# Patient Record
Sex: FEMALE | Race: WHITE | NOT HISPANIC OR LATINO
[De-identification: names, ages, dates, MRNs, and addresses within clinical notes are randomized per-mention and may not be internally consistent; named-entity substitution may affect disease eponyms.]

---

## 2019-08-20 ENCOUNTER — RECORD ABSTRACTING (OUTPATIENT)
Age: 69
End: 2019-08-20

## 2019-08-20 ENCOUNTER — NON-APPOINTMENT (OUTPATIENT)
Age: 69
End: 2019-08-20

## 2019-08-20 ENCOUNTER — APPOINTMENT (OUTPATIENT)
Dept: CARDIOLOGY | Facility: CLINIC | Age: 69
End: 2019-08-20
Payer: MEDICARE

## 2019-08-20 VITALS
DIASTOLIC BLOOD PRESSURE: 70 MMHG | BODY MASS INDEX: 19.97 KG/M2 | WEIGHT: 117 LBS | HEART RATE: 70 BPM | OXYGEN SATURATION: 97 % | SYSTOLIC BLOOD PRESSURE: 110 MMHG | HEIGHT: 64 IN

## 2019-08-20 DIAGNOSIS — Z82.49 FAMILY HISTORY OF ISCHEMIC HEART DISEASE AND OTHER DISEASES OF THE CIRCULATORY SYSTEM: ICD-10-CM

## 2019-08-20 DIAGNOSIS — Z82.3 FAMILY HISTORY OF STROKE: ICD-10-CM

## 2019-08-20 DIAGNOSIS — Z80.41 FAMILY HISTORY OF MALIGNANT NEOPLASM OF OVARY: ICD-10-CM

## 2019-08-20 DIAGNOSIS — L65.9 NONSCARRING HAIR LOSS, UNSPECIFIED: ICD-10-CM

## 2019-08-20 DIAGNOSIS — G47.00 INSOMNIA, UNSPECIFIED: ICD-10-CM

## 2019-08-20 DIAGNOSIS — Z87.891 PERSONAL HISTORY OF NICOTINE DEPENDENCE: ICD-10-CM

## 2019-08-20 DIAGNOSIS — Z78.9 OTHER SPECIFIED HEALTH STATUS: ICD-10-CM

## 2019-08-20 DIAGNOSIS — R41.3 OTHER AMNESIA: ICD-10-CM

## 2019-08-20 DIAGNOSIS — Z78.0 ASYMPTOMATIC MENOPAUSAL STATE: ICD-10-CM

## 2019-08-20 PROCEDURE — 93000 ELECTROCARDIOGRAM COMPLETE: CPT

## 2019-08-20 PROCEDURE — 99204 OFFICE O/P NEW MOD 45 MIN: CPT

## 2019-08-20 RX ORDER — LEVOTHYROXINE SODIUM 88 UG/1
88 TABLET ORAL DAILY
Refills: 0 | Status: ACTIVE | COMMUNITY

## 2019-08-20 NOTE — REASON FOR VISIT
[Consultation] : a consultation regarding [Dizziness] : dizziness [Mitral Regurgitation] : mitral regurgitation [Palpitations] : palpitations [FreeTextEntry1] : 69 year old, white female is referred to me for consultation with complaint of intermittent palpitations. Once associated with dizziness while exercising. Most of the time at rest. No near syncope, or syncope. No chest pain or shortness of breath. Usually in the form of skipping beats. Her extra beats, which gives her rush. Recently, she has been started on antibiotics for lyme.\par She has no PND, orthopnea, pedal edema.\par She denies any other over-the-counter medication.\par She has been on Synthroid replacement for many years and according to a stable TSH.\par She has history of mitral valve prolapse and mitral insufficiency.\par Her diet. Activity level is stable.\par She is stable. Weight.\par She is stable. Sleep pattern.\par No claudication pain.\par No recent hospitalization.\par No significant over-the-counter medications \par

## 2019-08-20 NOTE — DISCUSSION/SUMMARY
[FreeTextEntry1] : 69-year-old female with above medical history active medical problems as noted below.\par ntermittent palpitations associated with rare dizziness/ no near syncopal or syncopal event and one episode of exertional palpitations. In presence of a history of mitral valve prolapse, mitral insufficiency. Her EKG shows normal sinus rhythm with normal intervals.\par Clinical examination to support diagnosis of mitral valve prolapse and mitral regurgitation.\par \par At present I recommended further evaluation with\par #1 24-hour Holter monitor to assess evaluate and rule out any significant high-grade cardiac arrhythmia\par #2 echocardiogram to evaluate his left ventricular ejection fraction, left atrial size, mitral valve prolapse, mitral insufficiency severity.\par #3 carotid Doppler study is recommended in presence of intermittent dizzinessto rule out carotid/vertebral basilar insufficiency.\par #4 increase fluid intake.\par #5 management of thyroid disorder. Would also help with many of her symptoms.\par #6 She will also have exercise treadmill stress test considering some of her symptoms related to exercise.\par \par Counseling regarding low saturated fat, salt and carbohydrate intake was reviewed. Active lifestyle and regular. Exercise along with weight management is advised.\par All the above were at length reviewed. Answered all the questions. Thank you very much for this kind referral. Please do not hesitate to give me a call for any question.\par Part of this transcription was done with voice recognition software and phonetically similar errors are common. I apologize for that. Please do not hesitate to call for any questions due to above.\par \par

## 2019-08-20 NOTE — PHYSICAL EXAM
[General Appearance - Well Developed] : well developed [Normal Appearance] : normal appearance [Well Groomed] : well groomed [General Appearance - Well Nourished] : well nourished [No Deformities] : no deformities [General Appearance - In No Acute Distress] : no acute distress [Normal Conjunctiva] : the conjunctiva exhibited no abnormalities [Eyelids - No Xanthelasma] : the eyelids demonstrated no xanthelasmas [Normal Oral Mucosa] : normal oral mucosa [No Oral Pallor] : no oral pallor [No Oral Cyanosis] : no oral cyanosis [Normal Jugular Venous A Waves Present] : normal jugular venous A waves present [No Jugular Venous Gabriel A Waves] : no jugular venous gabriel A waves [Normal Jugular Venous V Waves Present] : normal jugular venous V waves present [Normal] : normal [No Precordial Heave] : no precordial heave was noted [Normal Rate] : normal [Normal S2] : normal S2 [Normal S1] : normal S1 [No Gallop] : no gallop heard [Click] : a ~M click was heard [Midsystolic] : during midsystole [I] : a grade 1 [2+] : left 2+ [No Abnormalities] : the abdominal aorta was not enlarged and no bruit was heard [No Pitting Edema] : no pitting edema present [Respiration, Rhythm And Depth] : normal respiratory rhythm and effort [Auscultation Breath Sounds / Voice Sounds] : lungs were clear to auscultation bilaterally [Exaggerated Use Of Accessory Muscles For Inspiration] : no accessory muscle use [Abdomen Soft] : soft [Abdomen Tenderness] : non-tender [Abnormal Walk] : normal gait [Abdomen Mass (___ Cm)] : no abdominal mass palpated [Nail Clubbing] : no clubbing of the fingernails [Gait - Sufficient For Exercise Testing] : the gait was sufficient for exercise testing [Cyanosis, Localized] : no localized cyanosis [Petechial Hemorrhages (___cm)] : no petechial hemorrhages [Skin Color & Pigmentation] : normal skin color and pigmentation [] : no rash [Skin Lesions] : no skin lesions [No Venous Stasis] : no venous stasis [No Skin Ulcers] : no skin ulcer [No Xanthoma] : no  xanthoma was observed [Oriented To Time, Place, And Person] : oriented to person, place, and time [Affect] : the affect was normal [Mood] : the mood was normal [No Anxiety] : not feeling anxious [S3] : no S3 [S4] : no S4 [Right Carotid Bruit] : no bruit heard over the right carotid [Left Carotid Bruit] : no bruit heard over the left carotid [Right Femoral Bruit] : no bruit heard over the right femoral artery [Left Femoral Bruit] : no bruit heard over the left femoral artery [FreeTextEntry1] : No abdominal pulsation or bruit noted

## 2019-08-20 NOTE — ASSESSMENT
[FreeTextEntry1] : Reviewed on August 20, 2019\par EKG D. August 20, 2019. Normal sinus rhythm.\par Normal intervals.\par Recent labs. Normal vitamin D level. Normal CMP. , triglycerides 62, HDL 92, total cholesterol 213. CBC was stable. TSH was 6.57\par \par Echocardiogram 2006. Ejection fraction 58%. There was mild mitral regurgitation, mild to moderate tricuspid regurgitation. There was no mitral valve prolapse at that time.

## 2019-09-30 ENCOUNTER — APPOINTMENT (OUTPATIENT)
Dept: CARDIOLOGY | Facility: CLINIC | Age: 69
End: 2019-09-30
Payer: MEDICARE

## 2019-09-30 PROCEDURE — 93880 EXTRACRANIAL BILAT STUDY: CPT

## 2019-09-30 PROCEDURE — 93015 CV STRESS TEST SUPVJ I&R: CPT

## 2019-09-30 PROCEDURE — 93306 TTE W/DOPPLER COMPLETE: CPT

## 2019-10-02 ENCOUNTER — APPOINTMENT (OUTPATIENT)
Dept: CARDIOLOGY | Facility: CLINIC | Age: 69
End: 2019-10-02
Payer: MEDICARE

## 2019-10-02 VITALS
WEIGHT: 119 LBS | SYSTOLIC BLOOD PRESSURE: 102 MMHG | HEIGHT: 64 IN | HEART RATE: 71 BPM | BODY MASS INDEX: 20.32 KG/M2 | OXYGEN SATURATION: 99 % | DIASTOLIC BLOOD PRESSURE: 62 MMHG

## 2019-10-02 PROCEDURE — 93224 XTRNL ECG REC UP TO 48 HRS: CPT

## 2019-10-02 PROCEDURE — 99215 OFFICE O/P EST HI 40 MIN: CPT

## 2019-10-02 RX ORDER — ACETAMINOPHEN/DIPHENHYDRAMINE 500MG-25MG
1000 TABLET ORAL
Refills: 0 | Status: ACTIVE | COMMUNITY

## 2019-10-02 NOTE — REASON FOR VISIT
[Follow-Up - Clinic] : a clinic follow-up of [Mitral Regurgitation] : mitral regurgitation [Dizziness] : dizziness [Palpitations] : palpitations [FreeTextEntry1] : 69-year-old female comes for followup consultation due to review her echocardiogram stress test and carotid Dopplers. 3.\par Since last seen after stopping doxycycline. Most of her symptoms have resolved.\par She has no PND, orthopnea, pedal edema.\par She denies any other over-the-counter medication.\par She has been on Synthroid replacement for many years and according to a stable TSH.\par She has history of mitral valve prolapse and mitral insufficiency.\par Her diet. Activity level is stable.\par She is stable. Weight.\par She is stable. Sleep pattern.\par No claudication pain.\par No recent hospitalization.\par No significant over-the-counter medications \par

## 2019-10-02 NOTE — DISCUSSION/SUMMARY
[FreeTextEntry1] : 69-year-old female with above medical history an active medical problems as noted below.\par #1 echocardiogram reviewed were mild mitral regurgitation with normal chamber dimension and preserved. Ejection fraction reviewed. Consider followup in about 2-3 years or for any change in her symptoms.\par #2 exercise treadmill stress test reviewed. equvivocal findings discussed. Good prognostic information reviewed. Missing diagnosis in presence of equvivocal stress test was discussed. Further evaluation with stress echocardiogram or a myocardial perfusion scan discussed. She has declined at present. Recommended continued aggressive lifestyle and risk factor modifications. Discussed with her regarding 81 mg of aspirin for preventive reasons specified in presence of evidence of carotid atherosclerosis.\par #3 fasting lipid panel ordered. In presence of carotid atherosclerosis and a bowel stress test findings. If LDL cholesterol is greater than 70 consider statin therapy.\par \par If recurrence of palpitations, exertional shortness of breath, or chest pain. She is recommended. Contact us immediately so that appropriate for their evaluation, which may include further cardiovascular test like invasive coronary angiography can be discussed.\par \par Counseling regarding low saturated fat, salt and carbohydrate intake was reviewed. Active lifestyle and regular. Exercise along with weight management is advised.\par All the above were at length reviewed. Answered all the questions. Thank you very much for this kind referral. Please do not hesitate to give me a call for any question.\par Part of this transcription was done with voice recognition software and phonetically similar errors are common. I apologize for that. Please do not hesitate to call for any questions due to above.\par \par Followup at present is planned in one year unless change in her symptoms or her decision. P.

## 2019-10-02 NOTE — PHYSICAL EXAM
[General Appearance - Well Developed] : well developed [Normal Appearance] : normal appearance [Well Groomed] : well groomed [No Deformities] : no deformities [General Appearance - Well Nourished] : well nourished [General Appearance - In No Acute Distress] : no acute distress [Normal Conjunctiva] : the conjunctiva exhibited no abnormalities [Eyelids - No Xanthelasma] : the eyelids demonstrated no xanthelasmas [Normal Oral Mucosa] : normal oral mucosa [No Oral Pallor] : no oral pallor [No Oral Cyanosis] : no oral cyanosis [Normal Jugular Venous V Waves Present] : normal jugular venous V waves present [Normal Jugular Venous A Waves Present] : normal jugular venous A waves present [No Jugular Venous Gabriel A Waves] : no jugular venous gabriel A waves [Exaggerated Use Of Accessory Muscles For Inspiration] : no accessory muscle use [] : no respiratory distress [Respiration, Rhythm And Depth] : normal respiratory rhythm and effort [Abdomen Mass (___ Cm)] : no abdominal mass palpated [Gait - Sufficient For Exercise Testing] : the gait was sufficient for exercise testing [Abnormal Walk] : normal gait [Nail Clubbing] : no clubbing of the fingernails [Cyanosis, Localized] : no localized cyanosis [Skin Color & Pigmentation] : normal skin color and pigmentation [Affect] : the affect was normal [Oriented To Time, Place, And Person] : oriented to person, place, and time [No Anxiety] : not feeling anxious [Mood] : the mood was normal [Normal] : normal [No Precordial Heave] : no precordial heave was noted [Normal Rate] : normal [Normal S1] : normal S1 [Normal S2] : normal S2 [No Gallop] : no gallop heard [S3] : no S3 [S4] : no S4 [Click] : a ~M click was heard [I] : a grade 1 [Midsystolic] : during midsystole [Right Carotid Bruit] : no bruit heard over the right carotid [Left Carotid Bruit] : no bruit heard over the left carotid [Right Femoral Bruit] : no bruit heard over the right femoral artery [Left Femoral Bruit] : no bruit heard over the left femoral artery [2+] : left 2+ [No Abnormalities] : the abdominal aorta was not enlarged and no bruit was heard [No Pitting Edema] : no pitting edema present

## 2019-10-02 NOTE — ASSESSMENT
[FreeTextEntry1] : Reviewed on August 20, 2019\par EKG D. August 20, 2019. Normal sinus rhythm.\par Normal intervals.\par Recent labs. Normal vitamin D level. Normal CMP. , triglycerides 62, HDL 92, total cholesterol 213. CBC was stable. TSH was 6.57\par \par Echocardiogram 2006. Ejection fraction 58%. There was mild mitral regurgitation, mild to moderate tricuspid regurgitation. There was no mitral valve prolapse at that time.\par \par Reviewed on October 2, 2019\par Acrochordon September 30, 2090 and ejection fraction 60% mild mitral regurgitation. Minimal aortic regurgitation. No significant pericardial effusion. Normal chamber dimensions.\par Exercise treadmill stress test September 30, 2019 p.o. 7 minutes and 32 seconds of Surya protocol. Asymptomatic. Normal blood pressure response were 96% predicted. Maximum heart rate. 9 METs workload. Equvivocall EKG changes.\par Carotid Doppler study. September 30, 2019. Atherosclerosis noted. Nonobstructive less than 50% bilaterally

## 2019-10-03 ENCOUNTER — APPOINTMENT (OUTPATIENT)
Dept: CARDIOLOGY | Facility: CLINIC | Age: 69
End: 2019-10-03

## 2020-05-08 ENCOUNTER — NON-APPOINTMENT (OUTPATIENT)
Age: 70
End: 2020-05-08

## 2020-05-08 ENCOUNTER — APPOINTMENT (OUTPATIENT)
Dept: CARDIOLOGY | Facility: CLINIC | Age: 70
End: 2020-05-08
Payer: MEDICARE

## 2020-05-08 VITALS
HEART RATE: 74 BPM | OXYGEN SATURATION: 97 % | HEIGHT: 64 IN | BODY MASS INDEX: 19.81 KG/M2 | TEMPERATURE: 98.1 F | WEIGHT: 116 LBS | SYSTOLIC BLOOD PRESSURE: 114 MMHG | DIASTOLIC BLOOD PRESSURE: 62 MMHG

## 2020-05-08 DIAGNOSIS — I34.1 NONRHEUMATIC MITRAL (VALVE) PROLAPSE: ICD-10-CM

## 2020-05-08 DIAGNOSIS — E03.9 HYPOTHYROIDISM, UNSPECIFIED: ICD-10-CM

## 2020-05-08 DIAGNOSIS — Z00.00 ENCOUNTER FOR GENERAL ADULT MEDICAL EXAMINATION W/OUT ABNORMAL FINDINGS: ICD-10-CM

## 2020-05-08 PROCEDURE — 93000 ELECTROCARDIOGRAM COMPLETE: CPT | Mod: 59

## 2020-05-08 PROCEDURE — 99214 OFFICE O/P EST MOD 30 MIN: CPT

## 2020-05-08 PROCEDURE — 0296T: CPT

## 2020-05-08 RX ORDER — CA/D3/MAG OX/ZINC/COP/MANG/BOR 600 MG-800
250 MCG TABLET,CHEWABLE ORAL DAILY
Refills: 0 | Status: DISCONTINUED | COMMUNITY
End: 2020-05-08

## 2020-05-08 RX ORDER — ASPIRIN ENTERIC COATED TABLETS 81 MG 81 MG/1
81 TABLET, DELAYED RELEASE ORAL DAILY
Refills: 0 | Status: ACTIVE | COMMUNITY

## 2020-05-08 RX ORDER — EUCALYPTUS OIL/MENTHOL/CAMPHOR 1.2%-4.8%
1000 OINTMENT (GRAM) TOPICAL
Refills: 0 | Status: DISCONTINUED | COMMUNITY
End: 2020-05-08

## 2020-05-08 NOTE — PHYSICAL EXAM
[General Appearance - Well Developed] : well developed [Normal Appearance] : normal appearance [Well Groomed] : well groomed [General Appearance - Well Nourished] : well nourished [No Deformities] : no deformities [General Appearance - In No Acute Distress] : no acute distress [Normal Jugular Venous A Waves Present] : normal jugular venous A waves present [Normal Jugular Venous V Waves Present] : normal jugular venous V waves present [No Jugular Venous Gabriel A Waves] : no jugular venous gabriel A waves [Exaggerated Use Of Accessory Muscles For Inspiration] : no accessory muscle use [] : no respiratory distress [Respiration, Rhythm And Depth] : normal respiratory rhythm and effort [Abnormal Walk] : normal gait [Nail Clubbing] : no clubbing of the fingernails [Gait - Sufficient For Exercise Testing] : the gait was sufficient for exercise testing [Cyanosis, Localized] : no localized cyanosis [Skin Color & Pigmentation] : normal skin color and pigmentation [Affect] : the affect was normal [Oriented To Time, Place, And Person] : oriented to person, place, and time [Mood] : the mood was normal [No Anxiety] : not feeling anxious [Normal Rate] : normal [No Precordial Heave] : no precordial heave was noted [Normal] : normal [Normal S2] : normal S2 [Normal S1] : normal S1 [No Gallop] : no gallop heard [S3] : no S3 [S4] : no S4 [Midsystolic] : during midsystole [Click] : a ~M click was heard [I] : a grade 1 [Right Carotid Bruit] : no bruit heard over the right carotid [Left Carotid Bruit] : no bruit heard over the left carotid [Left Femoral Bruit] : no bruit heard over the left femoral artery [Right Femoral Bruit] : no bruit heard over the right femoral artery [No Abnormalities] : the abdominal aorta was not enlarged and no bruit was heard [2+] : left 2+ [No Pitting Edema] : no pitting edema present

## 2020-05-08 NOTE — REASON FOR VISIT
[Acute Exacerbation] : an acute exacerbation of [Dizziness] : dizziness [Mitral Regurgitation] : mitral regurgitation [Palpitations] : palpitations [FreeTextEntry1] : 69-year-old female came to the office for urgent evaluation as increase intermittent palpitations.  Skipping beats.  At rest without exertion.  No associated dizziness lightheadedness or syncopal event.  No chest pain.  Nonexertional.  She has caffeine and alcohol daily.  Some worsening post her daily alcohol.\par She has no PND, orthopnea, pedal edema.\par She denies any other over-the-counter medication.\par She has been on Synthroid replacement for many years and according to a stable TSH.\par She has history of mitral valve prolapse and mitral insufficiency.\par Her diet. Activity level is stable.\par She is stable. Weight.\par She is stable. Sleep pattern.\par No claudication pain.\par No recent hospitalization.\par No significant over-the-counter medications \par

## 2020-05-08 NOTE — ASSESSMENT
[FreeTextEntry1] : Reviewed on August 20, 2019\par EKG D. August 20, 2019. Normal sinus rhythm.\par Normal intervals.\par Recent labs. Normal vitamin D level. Normal CMP. , triglycerides 62, HDL 92, total cholesterol 213. CBC was stable. TSH was 6.57\par \par Echocardiogram 2006. Ejection fraction 58%. There was mild mitral regurgitation, mild to moderate tricuspid regurgitation. There was no mitral valve prolapse at that time.\par \par Reviewed on October 2, 2019\par Acrochordon September 30, 2090 and ejection fraction 60% mild mitral regurgitation. Minimal aortic regurgitation. No significant pericardial effusion. Normal chamber dimensions.\par Exercise treadmill stress test September 30, 2019 p.o. 7 minutes and 32 seconds of Surya protocol. Asymptomatic. Normal blood pressure response were 96% predicted. Maximum heart rate. 9 METs workload. Equvivocall EKG changes.\par Carotid Doppler study. September 30, 2019. Atherosclerosis noted. Nonobstructive less than 50% bilaterally\par \par Reviewed 5/8/2020\par EKG normal sinus rhythm.  Heart rate 70.  Nonspecific ST-T changes.  Normal intervals.

## 2020-05-08 NOTE — DISCUSSION/SUMMARY
[FreeTextEntry1] : 69-year-old female with above medical history an active medical problems as noted below.\par #1  Intermittent palpitations.  Unclear etiology.  Worsened recently.  Recommended decrease caffeine and alcohol intake.  Last echocardiogram stable with preserved LV ejection fraction.  Mild mitral regurgitation..  Nonexertional symptoms.  Last exercise stress test had shown equivocal EKG changes but no exercise-induced arrhythmias.  She has no unstable chest pain symptoms.\par Recommended her to have labs as ordered and 2-day event recorder.\par Based on those tests we can discuss further whether she would benefit from beta-blockers as well as further cardiovascular test in the form of echocardiogram, ischemic evaluation etc.\par #2 fasting lipid panel ordered. In presence of carotid atherosclerosis and a bowel stress test findings. If LDL cholesterol is greater than 70 consider statin therapy.\par If any significant worsening she will call 911 or go to the nearest emergency room.\par \par Counseling regarding low saturated fat, salt and carbohydrate intake was reviewed. Active lifestyle and regular. Exercise along with weight management is advised.\par All the above were at length reviewed. Answered all the questions. Thank you very much for this kind referral. Please do not hesitate to give me a call for any question.\par Part of this transcription was done with voice recognition software and phonetically similar errors are common. I apologize for that. Please do not hesitate to call for any questions due to above.\par \par

## 2020-05-08 NOTE — REVIEW OF SYSTEMS
[see HPI] : see HPI [Shortness Of Breath] : no shortness of breath [Dyspnea on exertion] : not dyspnea during exertion [Chest  Pressure] : no chest pressure [Lower Ext Edema] : no extremity edema [Chest Pain] : no chest pain [Leg Claudication] : no intermittent leg claudication [Palpitations] : palpitations [Negative] : Endocrine

## 2020-05-19 PROCEDURE — 0298T: CPT

## 2020-05-22 ENCOUNTER — APPOINTMENT (OUTPATIENT)
Dept: CARDIOLOGY | Facility: CLINIC | Age: 70
End: 2020-05-22
Payer: MEDICARE

## 2020-05-22 VITALS — BODY MASS INDEX: 19.63 KG/M2 | WEIGHT: 115 LBS | HEIGHT: 64 IN

## 2020-05-22 PROCEDURE — 99214 OFFICE O/P EST MOD 30 MIN: CPT | Mod: 95

## 2020-05-22 NOTE — REASON FOR VISIT
[FreeTextEntry1] : \par No claudication pain.\par No recent hospitalization.\par No significant over-the-counter medications \par

## 2020-05-22 NOTE — HISTORY OF PRESENT ILLNESS
[Home] : at home, [unfilled] , at the time of the visit. [Medical Office: ([facility])___] : at the medical office located in  [FreeTextEntry1] : Consent: Patient consented to virtual video visit.\par Time: A total of 20 minutes was spent in review of pertinent medical records, discussion with the patient, evaluation of the patient problem and coordination of the care plan as part of this online visit.\par \par No physical examination was performed as this visit was performed virtually with exceptions as noted below.\par as judged the risk of COVID-19 cross-contamination to be greater than benefit to the patient conferred by the physical examination.\par \par 69-year-old female came to the office for urgent evaluation as increase intermittent palpitations.  Skipping beats.  At rest without exertion.  No associated dizziness lightheadedness or syncopal event.  No chest pain.  Nonexertional.  She has caffeine and alcohol daily.  Some worsening post her daily alcohol.\par She has no PND, orthopnea, pedal edema.\par She denies any other over-the-counter medication.\par She has been on Synthroid replacement for many years and according to a stable TSH.\par She has history of mitral valve prolapse and mitral insufficiency.\par Her diet. Activity level is stable.\par She is stable. Weight.\par She is stable. Sleep pattern.\par \par Since her bowel according to her with decreasing her caffeine intake her symptoms have almost resolved.\par Her activity is improving gradually\par I have reviewed her Holter/event recorder and labs today.\par No recent subsequent hospital admission

## 2020-05-22 NOTE — DISCUSSION/SUMMARY
[FreeTextEntry1] : 69-year-old female with above medical history an active medical problems as noted below.\par #1  Intermittent palpitations.  Appears to be associated with premature atrial ventricular beats.  She has overall preserved LV ejection fraction.  No significant signs of unstable CAD.  No syncope.  Stable labs.\par Reviewed pathophysiology of her symptoms.\par Good prognostic information's discussed.\par Role of medications like beta-blocker/calcium channel blocker in management of her symptomatic premature atrial beats were reviewed.\par Considering her symptoms have significantly improved with quality of life altering changes in the form of decrease caffeine and increasing activity she has decided against using medications.  She understands risk and benefits of this present medications\par If any worsening symptoms she will contact me otherwise she will continue with lifestyle modification.\par #2  Reviewed labs which includes lipid panel.  Present no statin therapy advised.\par \par Counseling regarding low saturated fat, salt and carbohydrate intake was reviewed. Active lifestyle and regular. Exercise along with weight management is advised.\par All the above were at length reviewed. Answered all the questions. Thank you very much for this kind referral. Please do not hesitate to give me a call for any question.\par Part of this transcription was done with voice recognition software and phonetically similar errors are common. I apologize for that. Please do not hesitate to call for any questions due to above.\par \par

## 2020-05-22 NOTE — ASSESSMENT
[FreeTextEntry1] : Reviewed on August 20, 2019\par EKG D. August 20, 2019. Normal sinus rhythm.\par Normal intervals.\par Recent labs. Normal vitamin D level. Normal CMP. , triglycerides 62, HDL 92, total cholesterol 213. CBC was stable. TSH was 6.57\par \par Echocardiogram 2006. Ejection fraction 58%. There was mild mitral regurgitation, mild to moderate tricuspid regurgitation. There was no mitral valve prolapse at that time.\par \par Reviewed on October 2, 2019\par Acrochordon September 30, 2090 and ejection fraction 60% mild mitral regurgitation. Minimal aortic regurgitation. No significant pericardial effusion. Normal chamber dimensions.\par Exercise treadmill stress test September 30, 2019 p.o. 7 minutes and 32 seconds of Surya protocol. Asymptomatic. Normal blood pressure response were 96% predicted. Maximum heart rate. 9 METs workload. Equvivocall EKG changes.\par Carotid Doppler study. September 30, 2019. Atherosclerosis noted. Nonobstructive less than 50% bilaterally\par \par Reviewed 5/8/2020\par EKG normal sinus rhythm.  Heart rate 70.  Nonspecific ST-T changes.  Normal intervals.\par \par Reviewed on May 22, 2020\par Labs May 12, 2020.  Stable CBC, CMP, TSH.  Normal Lyme titers.  Magnesium normal.   total cholesterol 210 HDL 94 triglycerides 6410-year ASCVD risk less than 7.5%\par Cardiac event monitoring showed premature atrial or ventricular beats.  Association with PACs and symptoms noted.  No significant high risk arrhythmias.

## 2020-05-22 NOTE — REVIEW OF SYSTEMS
[see HPI] : see HPI [Shortness Of Breath] : no shortness of breath [Dyspnea on exertion] : not dyspnea during exertion [Chest  Pressure] : no chest pressure [Chest Pain] : no chest pain [Lower Ext Edema] : no extremity edema [Palpitations] : palpitations [Leg Claudication] : no intermittent leg claudication [Negative] : Heme/Lymph

## 2020-05-22 NOTE — PHYSICAL EXAM
[Normal Appearance] : normal appearance [General Appearance - In No Acute Distress] : no acute distress [] : no respiratory distress [Respiration, Rhythm And Depth] : normal respiratory rhythm and effort [Exaggerated Use Of Accessory Muscles For Inspiration] : no accessory muscle use [Oriented To Time, Place, And Person] : oriented to person, place, and time [Affect] : the affect was normal [Mood] : the mood was normal [No Anxiety] : not feeling anxious

## 2020-11-20 ENCOUNTER — APPOINTMENT (OUTPATIENT)
Dept: CARDIOLOGY | Facility: CLINIC | Age: 70
End: 2020-11-20
Payer: MEDICARE

## 2020-11-20 VITALS
TEMPERATURE: 97.8 F | WEIGHT: 115 LBS | BODY MASS INDEX: 19.63 KG/M2 | SYSTOLIC BLOOD PRESSURE: 120 MMHG | HEART RATE: 65 BPM | DIASTOLIC BLOOD PRESSURE: 60 MMHG | OXYGEN SATURATION: 99 % | HEIGHT: 64 IN

## 2020-11-20 DIAGNOSIS — I49.49 OTHER PREMATURE DEPOLARIZATION: ICD-10-CM

## 2020-11-20 DIAGNOSIS — I34.0 NONRHEUMATIC MITRAL (VALVE) INSUFFICIENCY: ICD-10-CM

## 2020-11-20 DIAGNOSIS — R00.2 PALPITATIONS: ICD-10-CM

## 2020-11-20 DIAGNOSIS — R42 DIZZINESS AND GIDDINESS: ICD-10-CM

## 2020-11-20 DIAGNOSIS — I65.23 OCCLUSION AND STENOSIS OF BILATERAL CAROTID ARTERIES: ICD-10-CM

## 2020-11-20 DIAGNOSIS — R94.30 ABNORMAL RESULT OF CARDIOVASCULAR FUNCTION STUDY, UNSPECIFIED: ICD-10-CM

## 2020-11-20 PROCEDURE — 99214 OFFICE O/P EST MOD 30 MIN: CPT

## 2020-11-20 RX ORDER — CHOLECALCIFEROL (VITAMIN D3) 25 MCG
TABLET ORAL
Refills: 0 | Status: ACTIVE | COMMUNITY

## 2020-11-20 RX ORDER — SERTRALINE 25 MG/1
25 TABLET, FILM COATED ORAL DAILY
Refills: 0 | Status: DISCONTINUED | COMMUNITY
End: 2020-11-20

## 2020-11-20 NOTE — PHYSICAL EXAM
[General Appearance - Well Developed] : well developed [Normal Appearance] : normal appearance [Well Groomed] : well groomed [General Appearance - Well Nourished] : well nourished [No Deformities] : no deformities [General Appearance - In No Acute Distress] : no acute distress [FreeTextEntry1] : no JVD, no bruit [Respiration, Rhythm And Depth] : normal respiratory rhythm and effort [Exaggerated Use Of Accessory Muscles For Inspiration] : no accessory muscle use [Auscultation Breath Sounds / Voice Sounds] : lungs were clear to auscultation bilaterally [Heart Rate And Rhythm] : heart rate and rhythm were normal [Heart Sounds] : normal S1 and S2 [Murmurs] : no murmurs present [Edema] : no peripheral edema present [Abdomen Soft] : soft [Abnormal Walk] : normal gait [Gait - Sufficient For Exercise Testing] : the gait was sufficient for exercise testing [Nail Clubbing] : no clubbing of the fingernails [Cyanosis, Localized] : no localized cyanosis [Petechial Hemorrhages (___cm)] : no petechial hemorrhages [Skin Color & Pigmentation] : normal skin color and pigmentation [] : no rash [No Venous Stasis] : no venous stasis [Skin Lesions] : no skin lesions [No Skin Ulcers] : no skin ulcer [No Xanthoma] : no  xanthoma was observed [Oriented To Time, Place, And Person] : oriented to person, place, and time [Affect] : the affect was normal [Mood] : the mood was normal [No Anxiety] : not feeling anxious

## 2020-11-20 NOTE — DISCUSSION/SUMMARY
[FreeTextEntry1] : 70-year-old female with above medical history an active medical problems as noted below:\par \par #1  Intermittent palpitations.  Appears to be associated with premature atrial ventricular beats.  She has overall preserved LV ejection fraction.  No significant signs of unstable CAD.  No syncope.  Stable labs.\par Reviewed pathophysiology of her symptoms. Considering her symptoms have significantly improved with quality of life altering changes in the form of decrease caffeine and increasing activity she has decided against using medications.  She understands risk and benefits of this present medications\par If any worsening symptoms she will contact me otherwise she will continue with lifestyle modification.\par \par #2 Dizziness. Appears to be post exertion and admits to lack of hydration. Encouraged increased H2O with electrolytes especially with heavy exertion. \par \par #3 Carotid atherosclerosis. <50% nonobstructive. Neurologic symptoms discussed seem chronic and progressive. She is under the care of neurology pending results of PET MRI. There has been no discussion of TIA/CVA. No evidence of AF in the past. Continue preventative ASA 81mg daily. \par \par Referral provided to speech pathology.\par Reviewed any symptoms which would warrant sooner cardiology eval, otherwise f/u 6 months. \par Any questions and concerns were addressed and resolved. \par \par Sincerely,\par \par EVELYNE Contreras\par Patients history, testing, and plan reviewed with supervising MD: Dr. Dontrell Centeno

## 2020-11-20 NOTE — ASSESSMENT
[FreeTextEntry1] : \par Past testing for reference: \par Echocardiogram 2006. Ejection fraction 58%. There was mild mitral regurgitation, mild to moderate tricuspid regurgitation. There was no mitral valve prolapse at that time.\par \par Echo September 30, 2019 and ejection fraction 60% mild mitral regurgitation. Minimal aortic regurgitation. No significant pericardial effusion. Normal chamber dimensions.\par \par Exercise treadmill stress test September 30, 2019 p.o. 7 minutes and 32 seconds of Surya protocol. Asymptomatic. Normal blood pressure response were 96% predicted. Maximum heart rate. 9 METs workload. Equvivocall EKG changes.\par \par Carotid Doppler study. September 30, 2019. Atherosclerosis noted. Nonobstructive less than 50% bilaterally\par \par Reviewed 5/8/2020 EKG normal sinus rhythm.  Heart rate 70.  Nonspecific ST-T changes.  Normal intervals.\par \par Labs May 12, 2020.  Stable CBC, CMP, TSH.  Normal Lyme titers.  Magnesium normal.   total cholesterol 210 HDL 94 triglycerides 6410-year ASCVD risk less than 7.5%\par \par Cardiac event monitoring showed premature atrial or ventricular beats.  Association with PACs and symptoms noted.  No significant high risk arrhythmias.

## 2020-11-20 NOTE — REASON FOR VISIT
[Follow-Up - Clinic] : a clinic follow-up of [Carotid Artery Stenosis] : carotid stenosis [Palpitations] : palpitations [FreeTextEntry1] : PVCs

## 2020-11-20 NOTE — HISTORY OF PRESENT ILLNESS
[FreeTextEntry1] : \par 70 F here today for routine followup. \par \par PMH of nonobx carotid atherosclerosis, extrasystolic arrhythmia, PVCs.\par \par There is no prior history of myocardial infarction, coronary revascularization, history of ischemic heart disease, or symptomatic congestive heart failure. There is no history of atrial fibrillation. There is no history of hypertension, diabetes, or renal insufficiency. \par \par She is undergoing evaluation for neurologic issues. She has difficulty with speech and dexterity in her left hand. She has had 3 neurologic consultation, had MRI. She was not told of any stroke or TIA. Pending results of PET MRI. Symptoms seem chronic and progressive in nature. \par \par She remains active at [employer] during the week, takes exercise classes and walks prior. With heavy exertion she gets dizzy post exertion. She admits to lack of hydration. There has been no syncopal events. Denies exertional chest pain/pressure, sob. \par \par She had palpitations early this year. Symptoms improved with reduction in caffeine. She denies any recurrent events since drinking only one cup a day. Denies signficiant etoh intake.

## 2020-11-30 ENCOUNTER — OUTPATIENT (OUTPATIENT)
Dept: OUTPATIENT SERVICES | Facility: HOSPITAL | Age: 70
LOS: 1 days | Discharge: HOME | End: 2020-11-30

## 2021-05-13 ENCOUNTER — TRANSCRIPTION ENCOUNTER (OUTPATIENT)
Age: 71
End: 2021-05-13

## 2021-05-17 ENCOUNTER — TRANSCRIPTION ENCOUNTER (OUTPATIENT)
Age: 71
End: 2021-05-17

## 2024-04-08 ENCOUNTER — OFFICE (OUTPATIENT)
Dept: URBAN - METROPOLITAN AREA CLINIC 8 | Facility: CLINIC | Age: 74
Setting detail: OPHTHALMOLOGY
End: 2024-04-08
Payer: MEDICARE

## 2024-04-08 DIAGNOSIS — H25.13: ICD-10-CM

## 2024-04-08 DIAGNOSIS — H52.4: ICD-10-CM

## 2024-04-08 DIAGNOSIS — H40.013: ICD-10-CM

## 2024-04-08 PROCEDURE — 92015 DETERMINE REFRACTIVE STATE: CPT | Performed by: OPHTHALMOLOGY

## 2024-04-08 PROCEDURE — 92004 COMPRE OPH EXAM NEW PT 1/>: CPT | Performed by: OPHTHALMOLOGY

## 2024-10-16 PROBLEM — H01.001 BLEPHARITIS; RIGHT UPPER LID, RIGHT LOWER LID, LEFT UPPER LID, LEFT LOWER LID: Status: ACTIVE | Noted: 2024-10-16

## 2024-10-16 PROBLEM — H01.004 BLEPHARITIS; RIGHT UPPER LID, RIGHT LOWER LID, LEFT UPPER LID, LEFT LOWER LID: Status: ACTIVE | Noted: 2024-10-16

## 2024-10-16 PROBLEM — H01.005 BLEPHARITIS; RIGHT UPPER LID, RIGHT LOWER LID, LEFT UPPER LID, LEFT LOWER LID: Status: ACTIVE | Noted: 2024-10-16

## 2024-10-16 PROBLEM — H01.002 BLEPHARITIS; RIGHT UPPER LID, RIGHT LOWER LID, LEFT UPPER LID, LEFT LOWER LID: Status: ACTIVE | Noted: 2024-10-16

## 2025-03-18 PROBLEM — H01.004 BLEPHARITIS; RIGHT UPPER LID, RIGHT LOWER LID, LEFT UPPER LID, LEFT LOWER LID: Status: ACTIVE | Noted: 2025-03-18

## 2025-03-18 PROBLEM — H01.005 BLEPHARITIS; RIGHT UPPER LID, RIGHT LOWER LID, LEFT UPPER LID, LEFT LOWER LID: Status: ACTIVE | Noted: 2025-03-18

## 2025-03-18 PROBLEM — H01.001 BLEPHARITIS; RIGHT UPPER LID, RIGHT LOWER LID, LEFT UPPER LID, LEFT LOWER LID: Status: ACTIVE | Noted: 2025-03-18

## 2025-03-18 PROBLEM — H01.002 BLEPHARITIS; RIGHT UPPER LID, RIGHT LOWER LID, LEFT UPPER LID, LEFT LOWER LID: Status: ACTIVE | Noted: 2025-03-18

## 2025-06-09 PROBLEM — H01.005 BLEPHARITIS; RIGHT UPPER LID, RIGHT LOWER LID, LEFT UPPER LID, LEFT LOWER LID: Status: ACTIVE | Noted: 2025-06-09

## 2025-06-09 PROBLEM — H01.002 BLEPHARITIS; RIGHT UPPER LID, RIGHT LOWER LID, LEFT UPPER LID, LEFT LOWER LID: Status: ACTIVE | Noted: 2025-06-09

## 2025-06-09 PROBLEM — H01.001 BLEPHARITIS; RIGHT UPPER LID, RIGHT LOWER LID, LEFT UPPER LID, LEFT LOWER LID: Status: ACTIVE | Noted: 2025-06-09

## 2025-06-09 PROBLEM — H01.004 BLEPHARITIS; RIGHT UPPER LID, RIGHT LOWER LID, LEFT UPPER LID, LEFT LOWER LID: Status: ACTIVE | Noted: 2025-06-09
